# Patient Record
Sex: MALE | Race: WHITE
[De-identification: names, ages, dates, MRNs, and addresses within clinical notes are randomized per-mention and may not be internally consistent; named-entity substitution may affect disease eponyms.]

---

## 2019-01-26 ENCOUNTER — HOSPITAL ENCOUNTER (EMERGENCY)
Dept: HOSPITAL 47 - EC | Age: 54
Discharge: HOME | End: 2019-01-26
Payer: COMMERCIAL

## 2019-01-26 VITALS
RESPIRATION RATE: 16 BRPM | TEMPERATURE: 98.4 F | DIASTOLIC BLOOD PRESSURE: 72 MMHG | HEART RATE: 88 BPM | SYSTOLIC BLOOD PRESSURE: 183 MMHG

## 2019-01-26 DIAGNOSIS — I10: ICD-10-CM

## 2019-01-26 DIAGNOSIS — M25.461: Primary | ICD-10-CM
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
## 2019-01-26 DIAGNOSIS — F17.200: ICD-10-CM

## 2019-01-26 PROCEDURE — 99284 EMERGENCY DEPT VISIT MOD MDM: CPT

## 2019-01-26 PROCEDURE — 36415 COLL VENOUS BLD VENIPUNCTURE: CPT

## 2019-01-26 PROCEDURE — 80053 COMPREHEN METABOLIC PANEL: CPT

## 2019-01-26 PROCEDURE — 85025 COMPLETE CBC W/AUTO DIFF WBC: CPT

## 2019-01-26 PROCEDURE — 73706 CT ANGIO LWR EXTR W/O&W/DYE: CPT

## 2019-01-26 NOTE — CT
EXAMINATION TYPE: CT angio lower extremity RT

 

DATE OF EXAM: 1/26/2019 12:37 PM

 

COMPARISON: None.

 

HISTORY: Fall, dislocation of Rt knee

 

CT DLP: 1799.5 mGycm

Automated exposure control for dose reduction was used.

 

TECHNIQUE: 

Performed with IV Contrast, patient injected with 100 mL of Isovue 370.

 

FINDINGS: There are mild arthritic changes in both knees. There is a prominent right knee joint effus
ion. There is moderate soft tissue swelling and pretibial edema on the right.

 

The aorta, iliac, superficial femoral, popliteal and runoff vessels are patent bilaterally. There is 
no evidence of a significant hematoma about the knee.

 

There is a bilateral lysis at L5 with a grade 1 spondylolisthesis of L5 on S1. There is degenerative 
disc disease and hypertrophic spondylosis within the spine no bony fracture is seen.

 

IMPRESSION: 

1. NO ACUTE OSSEOUS LESION.

2. PROMINENT KNEE JOINT EFFUSION ON THE RIGHT.

3. NO EVIDENCE OF A SIGNIFICANT VASCULAR INJURY.

4. BILATERAL LYSIS AT L5 WITH A GRADE 1 SPONDYLOLISTHESIS OF L5 ON S1. 

5. DEGENERATIVE CHANGES WITHIN THE SPINE.

## 2019-01-26 NOTE — ED
Fall HPI





- General


Chief Complaint: Fall


Stated Complaint: fall, knee/back pain


Time Seen by Provider: 01/26/19 11:01


Source: patient


Mode of arrival: wheelchair





- History of Present Illness


Initial Comments: 


Patient is a 53-year-old male who presents with a chief complaint of right knee 

pain and lower back pain after a fall yesterday.  Patient states that he had a 

mechanical fall when he slipped on ice yesterday.  He states that when he fell 

his lower leg was angulated laterally and that he heard a pop and immediately 

felt pain.  The patient states that he manually reduced his knee at that time.  

He denies any head trauma and denies use of blood thinners.  Patient states he 

was able to ambulate after the event though it is painful.  He denies fever, 

chills, bowel or bladder incontinence, numbness or paresthesias, or cool 

extremities.  He denies head trauma or loss of consciousness.  Patient has 

tried taking Motrin for pain and states it is only mildly helping.  He has a 

history of hypertension and denies any medical ALLERGIES








- Related Data


 Allergies











Allergy/AdvReac Type Severity Reaction Status Date / Time


 


No Known Allergies Allergy   Verified 01/26/19 10:55














Review of Systems


ROS Statement: 


Those systems with pertinent positive or pertinent negative responses have been 

documented in the HPI.





ROS Other: All systems not noted in ROS Statement are negative.


Musculoskeletal: Reports: back pain, joint swelling





Past Medical History


Past Medical History: Hypertension


History of Any Multi-Drug Resistant Organisms: None Reported


Past Surgical History: Orthopedic Surgery


Past Psychological History: No Psychological Hx Reported


Smoking Status: Current every day smoker


Past Alcohol Use History: Occasional


Past Drug Use History: None Reported





General Exam


Limitations: no limitations


General appearance: alert, in no apparent distress


Head exam: Present: atraumatic, normocephalic


Eye exam: Present: normal appearance, PERRL


ENT exam: Present: normal exam


Neck exam: Present: normal inspection


Respiratory exam: Present: normal lung sounds bilaterally.  Absent: respiratory 

distress, wheezes


Cardiovascular Exam: Present: regular rate, normal rhythm


GI/Abdominal exam: Present: soft.  Absent: distended, tenderness


Rectal exam: Present: deferred


Extremities exam: Present: tenderness, joint swelling, other (patient has 

decreased ROM of the right knee.  there is swelling and bruising most 

significant on the medial joint line.  there is mild laxity with varus 

pressure.  lockman negative. no signs of compartment syndrome at this time. ).  

Absent: full ROM


Back exam: Present: normal inspection, paraspinal tenderness


Neurological exam: Present: alert, oriented X3


Psychiatric exam: Present: normal affect, normal mood


Skin exam: Present: warm, dry, intact





Course


 Vital Signs











  01/26/19





  10:53


 


Temperature 98.4 F


 


Pulse Rate 88


 


Respiratory 16





Rate 


 


Blood Pressure 183/72


 


O2 Sat by Pulse 97





Oximetry 














Medical Decision Making





- Medical Decision Making


Patient presents with a chief complaint fall yesterday.  On initial evaluation, 

patient is hypertensive but otherwise vital signs are stable.  History is 

concerning for a dislocation of the knee which was manually reduced by the 

patient.  There are no signs of compartment syndrome at present though I am 

concerned for medial joint line pathology.  She'll be evaluated with CT 

angiography of the right lower extremity to rule out vascular injury given 

mechanism.  He was given Norco for pain.  Patient will ultimately be placed in 

a knee immobilizer and instructed for Ortho follow up.   





Laboratory evaluation is unremarkable.  Patient's of her computed tomography 

scan of the lower extremities.  No vascular injury was identified.  There is a 

large knee effusion.  He was placed in a knee immobilizer and given orthopedic 

follow-up.  Instructed to follow-up in one to 2 days, return to the ED if 

symptoms worsen or change.








Disposition


Clinical Impression: 


 Fall, Knee effusion, right





Disposition: HOME SELF-CARE


Condition: Good


Is patient prescribed a controlled substance at d/c from ED?: No


Referrals: 


Giovanny Smith MD [Primary Care Provider] - 1-2 days


Dom Dhaliwal DO [Medical Doctor] - 1-2 days

## 2019-01-27 LAB
ALBUMIN SERPL-MCNC: 3 G/DL (ref 3.5–5)
ALP SERPL-CCNC: 20 U/L (ref 38–126)
ALT SERPL-CCNC: 30 U/L (ref 21–72)
ANION GAP SERPL CALC-SCNC: 5 MMOL/L
AST SERPL-CCNC: 25 U/L (ref 17–59)
BASOPHILS # BLD AUTO: 0 K/UL (ref 0–0.2)
BASOPHILS NFR BLD AUTO: 1 %
BUN SERPL-SCNC: 23 MG/DL (ref 9–20)
CALCIUM SPEC-MCNC: 8.8 MG/DL (ref 8.4–10.2)
CHLORIDE SERPL-SCNC: 113 MMOL/L (ref 98–107)
CO2 SERPL-SCNC: 23 MMOL/L (ref 22–30)
EOSINOPHIL # BLD AUTO: 0.3 K/UL (ref 0–0.7)
EOSINOPHIL NFR BLD AUTO: 4 %
ERYTHROCYTE [DISTWIDTH] IN BLOOD BY AUTOMATED COUNT: 4.56 M/UL (ref 4.3–5.9)
ERYTHROCYTE [DISTWIDTH] IN BLOOD: 14 % (ref 11.5–15.5)
GLUCOSE SERPL-MCNC: 117 MG/DL (ref 74–99)
HCT VFR BLD AUTO: 43.6 % (ref 39–53)
HGB BLD-MCNC: 14.4 GM/DL (ref 13–17.5)
LYMPHOCYTES # SPEC AUTO: 1.3 K/UL (ref 1–4.8)
LYMPHOCYTES NFR SPEC AUTO: 19 %
MCH RBC QN AUTO: 31.7 PG (ref 25–35)
MCHC RBC AUTO-ENTMCNC: 33.1 G/DL (ref 31–37)
MCV RBC AUTO: 95.8 FL (ref 80–100)
MONOCYTES # BLD AUTO: 0.4 K/UL (ref 0–1)
MONOCYTES NFR BLD AUTO: 7 %
NEUTROPHILS # BLD AUTO: 4.6 K/UL (ref 1.3–7.7)
NEUTROPHILS NFR BLD AUTO: 68 %
PLATELET # BLD AUTO: 185 K/UL (ref 150–450)
POTASSIUM SERPL-SCNC: 4.7 MMOL/L (ref 3.5–5.1)
PROT SERPL-MCNC: 5.8 G/DL (ref 6.3–8.2)
SODIUM SERPL-SCNC: 141 MMOL/L (ref 137–145)
WBC # BLD AUTO: 6.7 K/UL (ref 3.8–10.6)

## 2019-08-05 ENCOUNTER — HOSPITAL ENCOUNTER (OUTPATIENT)
Dept: HOSPITAL 47 - RADCTMAIN | Age: 54
Discharge: HOME | End: 2019-08-05
Attending: NURSE PRACTITIONER
Payer: COMMERCIAL

## 2019-08-05 DIAGNOSIS — M51.36: Primary | ICD-10-CM

## 2019-08-05 PROCEDURE — 72131 CT LUMBAR SPINE W/O DYE: CPT

## 2019-08-05 NOTE — CT
EXAMINATION TYPE: CT lumbar spine wo con

 

DATE OF EXAM: 8/5/2019 12:40 PM

 

COMPARISON: None.

 

HISTORY: chronic low back pain/radiates down both legs

 

CT DLP: 1427 mGycm

Automated exposure control for dose reduction was used.

 

Unenhanced CT of the lumbar spine was performed.  Bone and soft tissue window settings are submitted 
as well as coronal and sagittal reconstructions. 

 

There are 5 lumbar type vertebra. Lumbar spine shows satisfactory alignment without acute fracture or
 dislocation. There is mild disc space narrowing L3-L4 through the L5-S1 levels. Posterior disc herni
ations are seen at these levels on sagittal images. There is moderate multilevel anterior and lateral
 spurring.

 

Axial images show the T12-L1 through the L2-L3 level to appear within normal limits.

 

Axial images at the L3-L4 level demonstrates mild-to-moderate broad disc bulge effacing the anterior 
thecal sac with mild facet degenerative changes bilaterally. There is mild right greater than left bi
lateral anterior inferior neural foraminal narrowing.

 

Axial images at the L4-L5 level moderate facet degenerative changes bilaterally with broad disc bulge
. Spinal canal is preserved. Mild-to-moderate left greater than right bilateral neural foraminal narr
owing is seen.

 

Axial images at the L5-S1 level show bilateral pars defects without spondylolisthesis. There is mild 
facet arthropathy bilaterally. There is central disc protrusion axial image 73 but spinal canal is pr
eserved. Bilateral neural foramina are patent.

 

No suspicious incidental findings in the visualized retroperitoneum.

 

IMPRESSION: Multilevel degenerative changes in the mid to lower lumbar spine as detailed above.